# Patient Record
Sex: MALE | Race: WHITE | NOT HISPANIC OR LATINO | URBAN - METROPOLITAN AREA
[De-identification: names, ages, dates, MRNs, and addresses within clinical notes are randomized per-mention and may not be internally consistent; named-entity substitution may affect disease eponyms.]

---
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
## 2021-08-13 NOTE — PROCEDURE: TREATMENT REGIMEN
Detail Level: Simple
Plan: - patient states he has seen great improvement with his Acne \\n- patient is currently on Doxycycline 100mg one pill once daily\\n- will have patient take Doxycycline 50mg one pill once daily for maintenance \\n- patient will take high dose doxycycline only when flared \\n- advised pt he can not be on high dose for long periods\\n- picture taken at today’s visit \\n- patient will f/u in 6 months\\n- patient does not want to use topicals; counseled patient he MUST wash his face twice daily to maintain clear skin (he currently states he showers twice weekly and that is when he washes his face)
Modify Regimen: -Doxycycline 50mg one pill once daily\\n-Doxycycline 100mg one pill once daily only when flared
[facility]